# Patient Record
Sex: FEMALE | Race: WHITE | URBAN - METROPOLITAN AREA
[De-identification: names, ages, dates, MRNs, and addresses within clinical notes are randomized per-mention and may not be internally consistent; named-entity substitution may affect disease eponyms.]

---

## 2021-10-25 ENCOUNTER — OFFICE VISIT (OUTPATIENT)
Dept: URBAN - METROPOLITAN AREA CLINIC 96 | Facility: CLINIC | Age: 61
End: 2021-10-25
Payer: COMMERCIAL

## 2021-10-25 ENCOUNTER — WEB ENCOUNTER (OUTPATIENT)
Dept: URBAN - METROPOLITAN AREA CLINIC 96 | Facility: CLINIC | Age: 61
End: 2021-10-25

## 2021-10-25 ENCOUNTER — DASHBOARD ENCOUNTERS (OUTPATIENT)
Age: 61
End: 2021-10-25

## 2021-10-25 DIAGNOSIS — Z12.11 SCREEN FOR COLON CANCER: ICD-10-CM

## 2021-10-25 DIAGNOSIS — K21.9 GASTROESOPHAGEAL REFLUX DISEASE, UNSPECIFIED WHETHER ESOPHAGITIS PRESENT: ICD-10-CM

## 2021-10-25 DIAGNOSIS — R14.0 BLOATING: ICD-10-CM

## 2021-10-25 DIAGNOSIS — R10.2 PELVIC PAIN: ICD-10-CM

## 2021-10-25 DIAGNOSIS — R10.12 LUQ PAIN: ICD-10-CM

## 2021-10-25 PROBLEM — 235595009: Status: ACTIVE | Noted: 2021-10-25

## 2021-10-25 PROCEDURE — 74177 CT ABD & PELVIS W/CONTRAST: CPT | Performed by: INTERNAL MEDICINE

## 2021-10-25 PROCEDURE — 99244 OFF/OP CNSLTJ NEW/EST MOD 40: CPT | Performed by: INTERNAL MEDICINE

## 2021-10-25 PROCEDURE — 99204 OFFICE O/P NEW MOD 45 MIN: CPT | Performed by: INTERNAL MEDICINE

## 2021-10-25 RX ORDER — POLYETHYLENE GLYCOL 3350, SODIUM SULFATE, SODIUM CHLORIDE, POTASSIUM CHLORIDE, ASCORBIC ACID, SODIUM ASCORBATE 140-9-5.2G
AS DIRECTED KIT ORAL ONCE
Qty: 1 BOX | Refills: 0 | OUTPATIENT
Start: 2021-10-25 | End: 2021-10-26

## 2021-10-25 NOTE — HPI-TODAY'S VISIT:
referred by Dr. Barrett Mon for LUQ pain copy of the note sent to referring MD overdue for a colonosocpy had lower abdominal pain/pelvic pain/bloating--started 2 months ago the LUQ pain has been there for a couple of years. hx of fibroids, s/p partial hysterectomy when the pelvic pain started she had a bladder infection. went to see urologist,  hx of bladder tack 12/2016 urologist had her do urodynamic testing--> u/s was normal in [facility] office. recommended self-cathing, does it rarely went to see gyn--> diagnosed w/ mild prolapse.. had a [employer] expert put her on probiotics/digestive enzymes. less bloated, diet advised was meats and vegetables. cut out sugar, bread. did it for a month but now back on gluten generally regular BMs except when she travels colonoscopy at age 51 gerd can be bad, on omperazole

## 2021-10-28 LAB
DEAMIDATED GLIADIN ABS, IGA: 4
DEAMIDATED GLIADIN ABS, IGG: 2
ENDOMYSIAL ANTIBODY IGA: NEGATIVE
H PYLORI, IGM ABS: <9
H. PYLORI, IGA ABS: <9
H. PYLORI, IGG ABS: 0.17
IMMUNOGLOBULIN A, QN, SERUM: 244
T-TRANSGLUTAMINASE (TTG) IGA: <2
T-TRANSGLUTAMINASE (TTG) IGG: 2

## 2021-12-22 ENCOUNTER — OFFICE VISIT (OUTPATIENT)
Dept: URBAN - METROPOLITAN AREA SURGERY CENTER 18 | Facility: SURGERY CENTER | Age: 61
End: 2021-12-22